# Patient Record
Sex: MALE | Race: WHITE | Employment: UNEMPLOYED | URBAN - METROPOLITAN AREA
[De-identification: names, ages, dates, MRNs, and addresses within clinical notes are randomized per-mention and may not be internally consistent; named-entity substitution may affect disease eponyms.]

---

## 2017-03-15 ENCOUNTER — OFFICE VISIT (OUTPATIENT)
Dept: FAMILY MEDICINE CLINIC | Facility: CLINIC | Age: 27
End: 2017-03-15

## 2017-03-15 VITALS
WEIGHT: 301 LBS | RESPIRATION RATE: 16 BRPM | HEART RATE: 82 BPM | BODY MASS INDEX: 41.67 KG/M2 | TEMPERATURE: 98 F | SYSTOLIC BLOOD PRESSURE: 108 MMHG | HEIGHT: 71.25 IN | DIASTOLIC BLOOD PRESSURE: 70 MMHG

## 2017-03-15 DIAGNOSIS — N50.89 LUMP IN THE TESTICLE: ICD-10-CM

## 2017-03-15 DIAGNOSIS — R31.9 HEMATURIA: Primary | ICD-10-CM

## 2017-03-15 LAB
APPEARANCE: CLEAR
BILIRUBIN: NEGATIVE
GLUCOSE (URINE DIPSTICK): NEGATIVE MG/DL
KETONES (URINE DIPSTICK): NEGATIVE MG/DL
LEUKOCYTES: NEGATIVE
MULTISTIX LOT#: NORMAL NUMERIC
NITRITE, URINE: NEGATIVE
OCCULT BLOOD: NEGATIVE
PH, URINE: 6 (ref 4.5–8)
PROTEIN (URINE DIPSTICK): NEGATIVE MG/DL
SPECIFIC GRAVITY: 1.01 (ref 1–1.03)
URINE-COLOR: YELLOW
UROBILINOGEN,SEMI-QN: 0.2 MG/DL (ref 0–1.9)

## 2017-03-15 PROCEDURE — 81003 URINALYSIS AUTO W/O SCOPE: CPT | Performed by: FAMILY MEDICINE

## 2017-03-15 PROCEDURE — 87086 URINE CULTURE/COLONY COUNT: CPT | Performed by: FAMILY MEDICINE

## 2017-03-15 PROCEDURE — 99214 OFFICE O/P EST MOD 30 MIN: CPT | Performed by: FAMILY MEDICINE

## 2017-03-15 NOTE — PATIENT INSTRUCTIONS
-- we will call you with urine culture results  -- come in for fasting bloodwork in the next couple of days  -- drink plenty of water during the day  -- call to schedule testicular ultrasound  -- followup if blood comes back

## 2017-03-15 NOTE — PROGRESS NOTES
Tyrell Candelaria is a 23year old male here for Patient presents with:  Hematuria      HPI:     Hematuria  -notes 2 episodes yesterday  -since then, has not had recurrence  -denies dysuria, increased frequency  -no abd pain, nausea, flank pain  -did notic systems reviewed are negative    PHYSICAL EXAM:   /70 mmHg  Pulse 82  Temp(Src) 98.4 °F (36.9 °C) (Oral)  Resp 16  Ht 71.25\"  Wt 301 lb  BMI 41.68 kg/m2    Gen: NAD, alert and oriented x 3  Head: NCAT, pupils equal and round  Pulm: CTAB, no wheezing

## 2017-03-16 ENCOUNTER — LAB ENCOUNTER (OUTPATIENT)
Dept: LAB | Age: 27
End: 2017-03-16
Attending: Other
Payer: COMMERCIAL

## 2017-03-16 ENCOUNTER — HOSPITAL ENCOUNTER (OUTPATIENT)
Dept: ULTRASOUND IMAGING | Age: 27
Discharge: HOME | End: 2017-03-16
Attending: FAMILY MEDICINE
Payer: COMMERCIAL

## 2017-03-16 DIAGNOSIS — N50.89 LUMP IN THE TESTICLE: ICD-10-CM

## 2017-03-16 DIAGNOSIS — F34.89 OTHER SPECIFIED PERSISTENT MOOD DISORDERS (HCC): ICD-10-CM

## 2017-03-16 LAB
25-HYDROXYVITAMIN D (TOTAL): 29.8 NG/ML (ref 30–100)
ALBUMIN SERPL-MCNC: 3.8 G/DL (ref 3.5–4.8)
ALP LIVER SERPL-CCNC: 48 U/L (ref 45–117)
ALT SERPL-CCNC: 31 U/L (ref 17–63)
AMPHETAMINE URINE: NEGATIVE
AST SERPL-CCNC: 11 U/L (ref 15–41)
BARBITURATES URINE: NEGATIVE
BASOPHILS # BLD AUTO: 0.04 X10(3) UL (ref 0–0.1)
BASOPHILS NFR BLD AUTO: 0.6 %
BENZODIAZEPINES URINE: NEGATIVE
BILIRUB SERPL-MCNC: 0.4 MG/DL (ref 0.1–2)
BUN BLD-MCNC: 12 MG/DL (ref 8–20)
CALCIUM BLD-MCNC: 9.4 MG/DL (ref 8.3–10.3)
CANNABINOID URINE: NEGATIVE
CHLORIDE: 109 MMOL/L (ref 101–111)
CHOLEST SMN-MCNC: 174 MG/DL (ref ?–200)
CO2: 26 MMOL/L (ref 22–32)
COCAINE URINE: NEGATIVE
CREAT BLD-MCNC: 0.86 MG/DL (ref 0.7–1.3)
EOSINOPHIL # BLD AUTO: 0.08 X10(3) UL (ref 0–0.3)
EOSINOPHIL NFR BLD AUTO: 1.2 %
ERYTHROCYTE [DISTWIDTH] IN BLOOD BY AUTOMATED COUNT: 12.2 % (ref 11.5–16)
EST. AVERAGE GLUCOSE BLD GHB EST-MCNC: 94 MG/DL (ref 68–126)
GLUCOSE BLD-MCNC: 96 MG/DL (ref 70–99)
HAV AB SERPL IA-ACNC: 731 PG/ML (ref 193–986)
HBA1C MFR BLD HPLC: 4.9 % (ref ?–5.7)
HCT VFR BLD AUTO: 47.2 % (ref 37–53)
HDLC SERPL-MCNC: 37 MG/DL (ref 45–?)
HDLC SERPL: 4.7 {RATIO} (ref ?–4.97)
HGB BLD-MCNC: 16 G/DL (ref 13–17)
IMMATURE GRANULOCYTE COUNT: 0.04 X10(3) UL (ref 0–1)
IMMATURE GRANULOCYTE RATIO %: 0.6 %
LDLC SERPL CALC-MCNC: 106 MG/DL (ref ?–130)
LYMPHOCYTES # BLD AUTO: 2.01 X10(3) UL (ref 0.9–4)
LYMPHOCYTES NFR BLD AUTO: 30.5 %
M PROTEIN MFR SERPL ELPH: 6.6 G/DL (ref 6.1–8.3)
MCH RBC QN AUTO: 29.6 PG (ref 27–33.2)
MCHC RBC AUTO-ENTMCNC: 33.9 G/DL (ref 31–37)
MCV RBC AUTO: 87.4 FL (ref 80–99)
MONOCYTES # BLD AUTO: 0.46 X10(3) UL (ref 0.1–0.6)
MONOCYTES NFR BLD AUTO: 7 %
NEUTROPHIL ABS PRELIM: 3.95 X10 (3) UL (ref 1.3–6.7)
NEUTROPHILS # BLD AUTO: 3.95 X10(3) UL (ref 1.3–6.7)
NEUTROPHILS NFR BLD AUTO: 60.1 %
NONHDLC SERPL-MCNC: 137 MG/DL (ref ?–130)
OPIATE URINE: NEGATIVE
PCP URINE: NEGATIVE
PLATELET # BLD AUTO: 178 10(3)UL (ref 150–450)
POTASSIUM SERPL-SCNC: 4 MMOL/L (ref 3.6–5.1)
RBC # BLD AUTO: 5.4 X10(6)UL (ref 4.3–5.7)
RED CELL DISTRIBUTION WIDTH-SD: 38.6 FL (ref 35.1–46.3)
SODIUM SERPL-SCNC: 143 MMOL/L (ref 136–144)
TRIGLYCERIDES: 156 MG/DL (ref ?–150)
TSI SER-ACNC: 1.45 MIU/ML (ref 0.35–5.5)
VLDL: 31 MG/DL (ref 5–40)
WBC # BLD AUTO: 6.6 X10(3) UL (ref 4–13)

## 2017-03-16 PROCEDURE — 76870 US EXAM SCROTUM: CPT

## 2017-03-16 PROCEDURE — 80061 LIPID PANEL: CPT

## 2017-03-16 PROCEDURE — 83036 HEMOGLOBIN GLYCOSYLATED A1C: CPT

## 2017-03-16 PROCEDURE — 82607 VITAMIN B-12: CPT

## 2017-03-16 PROCEDURE — 93975 VASCULAR STUDY: CPT

## 2017-03-16 PROCEDURE — 85025 COMPLETE CBC W/AUTO DIFF WBC: CPT

## 2017-03-16 PROCEDURE — 84443 ASSAY THYROID STIM HORMONE: CPT

## 2017-03-16 PROCEDURE — 82306 VITAMIN D 25 HYDROXY: CPT

## 2017-03-16 PROCEDURE — 80053 COMPREHEN METABOLIC PANEL: CPT

## 2017-03-16 PROCEDURE — 80307 DRUG TEST PRSMV CHEM ANLYZR: CPT

## 2017-03-16 PROCEDURE — 36415 COLL VENOUS BLD VENIPUNCTURE: CPT

## 2017-04-07 ENCOUNTER — TELEPHONE (OUTPATIENT)
Dept: FAMILY MEDICINE CLINIC | Facility: CLINIC | Age: 27
End: 2017-04-07

## 2017-04-07 DIAGNOSIS — E55.9 VITAMIN D DEFICIENCY: Primary | ICD-10-CM

## 2017-04-07 RX ORDER — ERGOCALCIFEROL 1.25 MG/1
50000 CAPSULE ORAL WEEKLY
Qty: 12 CAPSULE | Refills: 0 | Status: SHIPPED | OUTPATIENT
Start: 2017-04-07 | End: 2017-07-06

## 2017-04-07 NOTE — TELEPHONE ENCOUNTER
The patient was informed that Alisha Escobar reviewed his recent lab results and that she would like him to schedule an appointment with her to discuss his results.   The patient stated that his parents were out of town so he wasn't sure when he would be able to

## 2017-04-11 ENCOUNTER — TELEPHONE (OUTPATIENT)
Dept: FAMILY MEDICINE CLINIC | Facility: CLINIC | Age: 27
End: 2017-04-11

## 2017-04-11 NOTE — TELEPHONE ENCOUNTER
Yes we did receive it and gave instructions. He was suppose to start vitamin D and make follow up appointment to discuss weight loss and lab results.

## 2017-04-11 NOTE — TELEPHONE ENCOUNTER
Ren Izquierdo from [facility]  office called to find out if [facility] received Plan of care letter from Dr. Damaris Thakur.   (Abnormal Labs)  Please call Ren Izquierdo at [phone]

## 2017-07-07 ENCOUNTER — OFFICE VISIT (OUTPATIENT)
Dept: FAMILY MEDICINE CLINIC | Facility: CLINIC | Age: 27
End: 2017-07-07

## 2017-07-07 VITALS
WEIGHT: 279 LBS | SYSTOLIC BLOOD PRESSURE: 128 MMHG | DIASTOLIC BLOOD PRESSURE: 90 MMHG | BODY MASS INDEX: 39.06 KG/M2 | OXYGEN SATURATION: 97 % | TEMPERATURE: 98 F | HEIGHT: 71 IN | RESPIRATION RATE: 17 BRPM | HEART RATE: 82 BPM

## 2017-07-07 DIAGNOSIS — E53.8 B12 DEFICIENCY: ICD-10-CM

## 2017-07-07 DIAGNOSIS — F98.8 ATTENTION DEFICIT DISORDER, UNSPECIFIED HYPERACTIVITY PRESENCE: ICD-10-CM

## 2017-07-07 DIAGNOSIS — E78.1 HIGH TRIGLYCERIDES: ICD-10-CM

## 2017-07-07 DIAGNOSIS — E55.9 VITAMIN D DEFICIENCY: ICD-10-CM

## 2017-07-07 DIAGNOSIS — Z91.09 SENSITIVITY TO SUNLIGHT: ICD-10-CM

## 2017-07-07 DIAGNOSIS — E78.6 LOW HDL (UNDER 40): ICD-10-CM

## 2017-07-07 DIAGNOSIS — Z00.00 PHYSICAL EXAM: Primary | ICD-10-CM

## 2017-07-07 PROCEDURE — 99395 PREV VISIT EST AGE 18-39: CPT | Performed by: PHYSICIAN ASSISTANT

## 2017-07-07 NOTE — PROGRESS NOTES
CHIEF COMPLAINT:     Patient presents with:  Physical: Work physical       HPI:   Robb Blake is a 33year old male who presents for a work physical. He works at [employer]. 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  He gets very sick with prolonged 128/90   Pulse 82   Temp 98.3 °F (36.8 °C) (Oral)   Resp 17   Ht 71\"   Wt 279 lb   SpO2 97%   BMI 38.91 kg/m²   GENERAL: well developed, well nourished,in no apparent distress  SKIN: no rashes,no suspicious lesions  HEAD: atraumatic, normocephalic  EYES:

## 2017-07-19 ENCOUNTER — LAB ENCOUNTER (OUTPATIENT)
Dept: LAB | Age: 27
End: 2017-07-19
Attending: Other
Payer: MEDICAID

## 2017-07-19 DIAGNOSIS — F90.0 ATTENTION DEFICIT HYPERACTIVITY DISORDER, INATTENTIVE TYPE: ICD-10-CM

## 2017-07-19 DIAGNOSIS — E55.9 VITAMIN D DEFICIENCY: ICD-10-CM

## 2017-07-19 LAB
25-HYDROXYVITAMIN D (TOTAL): 34.8 NG/ML (ref 30–100)
BARBITURATES URINE: NEGATIVE
BENZODIAZEPINES URINE: NEGATIVE
CANNABINOID URINE: NEGATIVE
COCAINE URINE: NEGATIVE
OPIATE URINE: NEGATIVE
PCP URINE: NEGATIVE

## 2017-07-19 PROCEDURE — 80307 DRUG TEST PRSMV CHEM ANLYZR: CPT

## 2017-07-19 PROCEDURE — 82306 VITAMIN D 25 HYDROXY: CPT

## 2017-07-19 PROCEDURE — 36415 COLL VENOUS BLD VENIPUNCTURE: CPT

## 2017-07-19 PROCEDURE — 80324 DRUG SCREEN AMPHETAMINES 1/2: CPT

## 2017-07-24 LAB
AMPHETAMINE, URINE: 3735 NG/ML
METAMPHETAMINE, URINE: <200 NG/ML
METHYLENEDIOXYAMPHETAMINE: <200 NG/ML
METHYLENEDIOXYETHYLAMPHETAMINE: <200 NG/ML
METHYLENEDIOXYMETAMPHETAMINE: <200 NG/ML

## 2017-09-01 ENCOUNTER — OFFICE VISIT (OUTPATIENT)
Dept: FAMILY MEDICINE CLINIC | Facility: CLINIC | Age: 27
End: 2017-09-01

## 2017-09-01 VITALS
WEIGHT: 270 LBS | DIASTOLIC BLOOD PRESSURE: 80 MMHG | BODY MASS INDEX: 38 KG/M2 | OXYGEN SATURATION: 98 % | HEART RATE: 90 BPM | RESPIRATION RATE: 16 BRPM | SYSTOLIC BLOOD PRESSURE: 120 MMHG | TEMPERATURE: 98 F

## 2017-09-01 DIAGNOSIS — H61.23 BILATERAL IMPACTED CERUMEN: ICD-10-CM

## 2017-09-01 DIAGNOSIS — H61.891 IRRITATION OF EXTERNAL EAR CANAL, RIGHT: Primary | ICD-10-CM

## 2017-09-01 PROCEDURE — 69209 REMOVE IMPACTED EAR WAX UNI: CPT | Performed by: NURSE PRACTITIONER

## 2017-09-01 NOTE — PROGRESS NOTES
CHIEF COMPLAINT:   Patient presents with:  Ear Problem: Right worse than left, feels clogged      HPI:   Noman Crowe is a 33year old male who presents for upper respiratory symptoms for  1 weeks. Patient reports muffled hearing.  Symptoms have been s HEAD: atraumatic, normocephalic.  no tenderness on palpation of maxillary or frontal sinuses.   EYES: conjunctiva clear, EOM intact  EARS: TM's clear, ear canals narrow, no bulging, no retraction,small clear fluid, bony landmarks visible  NOSE: Nostrils pat Tiny glands in your ear make substances that combine with dead skin cells to form earwax. Earwax helps protect your ear canal from water, dirt, infection, and injury.  Over time, earwax travels from the inner part of your ear canal to the entrance of the ca · Ear drops. These help to soften the earwax. This helps it leave the ear over time. · Rinsing (irrigation) of the ear canal with water. This is done in a doctor’s office. · Removal of the earwax with small tools. This is also done in a doctor’s office.

## 2017-09-12 ENCOUNTER — CHARTING TRANS (OUTPATIENT)
Dept: OCCUPATIONAL MEDICINE | Age: 27
End: 2017-09-12

## 2017-09-19 ENCOUNTER — CHARTING TRANS (OUTPATIENT)
Dept: OTHER | Age: 27
End: 2017-09-19

## 2017-09-26 ENCOUNTER — CHARTING TRANS (OUTPATIENT)
Dept: OCCUPATIONAL MEDICINE | Age: 27
End: 2017-09-26

## 2017-10-13 ENCOUNTER — CHARTING TRANS (OUTPATIENT)
Dept: OCCUPATIONAL MEDICINE | Age: 27
End: 2017-10-13

## 2017-10-28 ENCOUNTER — LAB ENCOUNTER (OUTPATIENT)
Dept: LAB | Age: 27
End: 2017-10-28
Attending: Other
Payer: MEDICAID

## 2017-10-28 ENCOUNTER — HOSPITAL ENCOUNTER (EMERGENCY)
Age: 27
Discharge: HOME | End: 2017-10-28
Attending: EMERGENCY MEDICINE
Payer: MEDICAID

## 2017-10-28 VITALS
TEMPERATURE: 98 F | BODY MASS INDEX: 36.4 KG/M2 | WEIGHT: 260 LBS | RESPIRATION RATE: 18 BRPM | HEIGHT: 71 IN | DIASTOLIC BLOOD PRESSURE: 83 MMHG | HEART RATE: 87 BPM | OXYGEN SATURATION: 98 % | SYSTOLIC BLOOD PRESSURE: 126 MMHG

## 2017-10-28 DIAGNOSIS — H60.312 ACUTE DIFFUSE OTITIS EXTERNA OF LEFT EAR: Primary | ICD-10-CM

## 2017-10-28 DIAGNOSIS — F41.9 ANXIETY DISORDER, UNSPECIFIED TYPE: ICD-10-CM

## 2017-10-28 PROCEDURE — 99283 EMERGENCY DEPT VISIT LOW MDM: CPT

## 2017-10-28 PROCEDURE — 80307 DRUG TEST PRSMV CHEM ANLYZR: CPT

## 2017-10-28 PROCEDURE — 83036 HEMOGLOBIN GLYCOSYLATED A1C: CPT

## 2017-10-28 PROCEDURE — 80053 COMPREHEN METABOLIC PANEL: CPT

## 2017-10-28 PROCEDURE — 85025 COMPLETE CBC W/AUTO DIFF WBC: CPT

## 2017-10-28 PROCEDURE — 80324 DRUG SCREEN AMPHETAMINES 1/2: CPT

## 2017-10-28 PROCEDURE — 82306 VITAMIN D 25 HYDROXY: CPT

## 2017-10-28 PROCEDURE — 36415 COLL VENOUS BLD VENIPUNCTURE: CPT

## 2017-10-28 PROCEDURE — 80061 LIPID PANEL: CPT

## 2017-10-28 PROCEDURE — 82607 VITAMIN B-12: CPT

## 2017-10-28 PROCEDURE — 84443 ASSAY THYROID STIM HORMONE: CPT

## 2017-10-28 RX ORDER — CIPROFLOXACIN AND DEXAMETHASONE 3; 1 MG/ML; MG/ML
4 SUSPENSION/ DROPS AURICULAR (OTIC) 2 TIMES DAILY
Qty: 1 BOTTLE | Refills: 0 | Status: SHIPPED | OUTPATIENT
Start: 2017-10-28 | End: 2017-11-04

## 2017-10-28 NOTE — ED PROVIDER NOTES
Patient Seen in: THE [facility] Emergency Department In [place]    History   Patient presents with:  Ear Problem Pain (neurosensory)    Stated Complaint: left ear pain since last night    67-year-old male pain for the past 1-2 days.   Patient has been putting Hematological: Negative. Psychiatric/Behavioral: Negative. All other systems reviewed and are negative. Positive for stated complaint: left ear pain since last night  Other systems are as noted in HPI. Constitutional and vital signs reviewed. well  ============================================================  ED Course  ------------------------------------------------------------  MDM    I discussed the diagnosis and need for followup with their primary care physician for further evaluation and

## 2017-10-28 NOTE — ED PROVIDER NOTES
I reviewed that chart and discussed the case with the physician assistant. I have examined the patient and noted patient has otitis externa. Will be given a WIC along with some Ciprodex drops. .    I agree with the physician assistant assessment and diagn

## 2017-11-01 ENCOUNTER — CHARTING TRANS (OUTPATIENT)
Dept: OCCUPATIONAL MEDICINE | Age: 27
End: 2017-11-01

## 2017-12-30 ENCOUNTER — OFFICE VISIT (OUTPATIENT)
Dept: FAMILY MEDICINE CLINIC | Facility: CLINIC | Age: 27
End: 2017-12-30

## 2017-12-30 VITALS
HEIGHT: 71 IN | BODY MASS INDEX: 37.1 KG/M2 | DIASTOLIC BLOOD PRESSURE: 84 MMHG | RESPIRATION RATE: 20 BRPM | WEIGHT: 265 LBS | OXYGEN SATURATION: 98 % | HEART RATE: 105 BPM | SYSTOLIC BLOOD PRESSURE: 122 MMHG | TEMPERATURE: 98 F

## 2017-12-30 DIAGNOSIS — J06.9 UPPER RESPIRATORY INFECTION WITH COUGH AND CONGESTION: Primary | ICD-10-CM

## 2017-12-30 PROCEDURE — 99213 OFFICE O/P EST LOW 20 MIN: CPT | Performed by: NURSE PRACTITIONER

## 2017-12-30 NOTE — PATIENT INSTRUCTIONS
1. Rest. Drink plenty of fluids. 2. Continue home meds. 3. Tylenol/Ibuprofen for pain/fevers  4. Use humidifier at home when possible.   5. Follow up with PMD in 3-4 days for reeval. Follow up sooner or go to the emergency department immediately if sympt · Over-the-counter cold medicines will not shorten the length of time you’re sick, but they may be helpful for the following symptoms: cough, sore throat, and nasal and sinus congestion.  (Note: Do not use decongestants if you have high blood pressure.)  Fo

## 2017-12-30 NOTE — PROGRESS NOTES
CHIEF COMPLAINT:   Patient presents with:  Cough  Sinus Problem      HPI:   Henny Traore is a 33year old male who presents for upper respiratory symptoms for  5 days. Patient reports congestion, dry cough. Symptoms have been intermittent since onset. /84   Pulse 105   Temp 98.2 °F (36.8 °C) (Oral)   Resp 20   Ht 71\"   Wt 265 lb   SpO2 98%   BMI 36.96 kg/m²   GENERAL: well developed, well nourished,in no apparent distress  SKIN: no rashes,no suspicious lesions  HEAD: atraumatic, normocephalic.   Sary Davis 5. Follow up with PMD in 3-4 days for reeval. Follow up sooner or go to the emergency department immediately if symptoms worsen, change, or if you have any concerns.       Viral Upper Respiratory Illness (Adult)  You have a viral upper respiratory illness ( · Over-the-counter cold medicines will not shorten the length of time you’re sick, but they may be helpful for the following symptoms: cough, sore throat, and nasal and sinus congestion.  (Note: Do not use decongestants if you have high blood pressure.)  Fo

## 2018-02-13 ENCOUNTER — HOSPITAL ENCOUNTER (EMERGENCY)
Age: 28
Discharge: HOME | End: 2018-02-13
Attending: EMERGENCY MEDICINE
Payer: COMMERCIAL

## 2018-02-13 VITALS
WEIGHT: 262 LBS | HEART RATE: 91 BPM | SYSTOLIC BLOOD PRESSURE: 121 MMHG | BODY MASS INDEX: 36.68 KG/M2 | RESPIRATION RATE: 16 BRPM | OXYGEN SATURATION: 99 % | TEMPERATURE: 99 F | DIASTOLIC BLOOD PRESSURE: 72 MMHG | HEIGHT: 71 IN

## 2018-02-13 DIAGNOSIS — S91.302A OPEN WOUND OF LEFT HEEL, INITIAL ENCOUNTER: Primary | ICD-10-CM

## 2018-02-13 PROCEDURE — 99283 EMERGENCY DEPT VISIT LOW MDM: CPT

## 2018-02-13 NOTE — ED PROVIDER NOTES
Patient Seen in: THE [facility] Emergency Department In [place]    History   Patient presents with:  Abscess (integumentary)    Stated Complaint: abscess     HPI  Patient is a 52-year-old male who states that for the past several days he has noticed a wound t strong dorsalis pedis pulse. SKIN: No rash, good turgor. NEURO: Patient answers questions appropriately. No focal deficits appreciated.          ED Course   Labs Reviewed - No data to display    ED Course as of Feb 13 1413  ------------------------------

## 2018-03-30 ENCOUNTER — HOSPITAL ENCOUNTER (EMERGENCY)
Age: 28
Discharge: HOME | End: 2018-03-30
Attending: EMERGENCY MEDICINE
Payer: COMMERCIAL

## 2018-03-30 VITALS
TEMPERATURE: 97 F | RESPIRATION RATE: 18 BRPM | SYSTOLIC BLOOD PRESSURE: 134 MMHG | DIASTOLIC BLOOD PRESSURE: 78 MMHG | HEIGHT: 71 IN | WEIGHT: 257 LBS | BODY MASS INDEX: 35.98 KG/M2 | HEART RATE: 66 BPM | OXYGEN SATURATION: 99 %

## 2018-03-30 DIAGNOSIS — T50.901A ACCIDENTAL MEDICATION OVERDOSE, INITIAL ENCOUNTER: Primary | ICD-10-CM

## 2018-03-30 PROCEDURE — 99284 EMERGENCY DEPT VISIT MOD MDM: CPT

## 2018-03-30 PROCEDURE — 93005 ELECTROCARDIOGRAM TRACING: CPT

## 2018-03-30 PROCEDURE — 93010 ELECTROCARDIOGRAM REPORT: CPT

## 2018-03-30 RX ORDER — QUETIAPINE 400 MG/1
400 TABLET, FILM COATED ORAL NIGHTLY
COMMUNITY
End: 2018-06-02

## 2018-03-30 RX ORDER — QUETIAPINE 300 MG/1
300 TABLET, FILM COATED ORAL NIGHTLY
COMMUNITY
End: 2018-06-02

## 2018-03-30 NOTE — ED NOTES
Poison control contacted. Case number [id number], spoke to [address] [place] [address]. Supportive care and EKG recommended, watch for QRS prolongation or changes.  EKG in progress

## 2018-03-30 NOTE — ED PROVIDER NOTES
Patient Seen in: [facility] Emergency Department In [place]    History   Patient presents with:  Ingestion    Stated Complaint: states took a total of three pills of quetiapan should have only been two.  thou*    HPI    18-year-old male presents emergency Alcohol use: No                Review of Systems    Positive for stated complaint: states took a total of three pills of quetiapan should have only been two. thou*  Other systems are as noted in HPI. Constitutional and vital signs reviewed.       A 66  Rhythm: Sinus Rhythm  Reading: Normal sinus rhythm, no acute ST or T-wave abnormality           ED Course as of Mar 30 1019  ------------------------------------------------------------       MDM       Patient placed on cardiac monitor, EKG performed.

## 2018-03-30 NOTE — ED INITIAL ASSESSMENT (HPI)
Pt took 2 400mg quetiapine and 1 300mg tablet Wednesday evening at 8pm. Spoke to pt md yesterday and was told to go to ED to be medically cleared last night. Pt sts he is a \"tad dizzy\" but otherwise feels ok.

## 2018-04-14 ENCOUNTER — HOSPITAL ENCOUNTER (EMERGENCY)
Age: 28
Discharge: HOME | End: 2018-04-14
Attending: EMERGENCY MEDICINE
Payer: COMMERCIAL

## 2018-04-14 VITALS
SYSTOLIC BLOOD PRESSURE: 120 MMHG | BODY MASS INDEX: 35.98 KG/M2 | OXYGEN SATURATION: 98 % | WEIGHT: 257 LBS | RESPIRATION RATE: 16 BRPM | HEIGHT: 71 IN | HEART RATE: 55 BPM | DIASTOLIC BLOOD PRESSURE: 76 MMHG | TEMPERATURE: 97 F

## 2018-04-14 DIAGNOSIS — R19.7 NAUSEA VOMITING AND DIARRHEA: Primary | ICD-10-CM

## 2018-04-14 DIAGNOSIS — R11.2 NAUSEA VOMITING AND DIARRHEA: Primary | ICD-10-CM

## 2018-04-14 PROCEDURE — 81001 URINALYSIS AUTO W/SCOPE: CPT | Performed by: EMERGENCY MEDICINE

## 2018-04-14 PROCEDURE — 96360 HYDRATION IV INFUSION INIT: CPT

## 2018-04-14 PROCEDURE — 99284 EMERGENCY DEPT VISIT MOD MDM: CPT

## 2018-04-14 PROCEDURE — 83690 ASSAY OF LIPASE: CPT | Performed by: EMERGENCY MEDICINE

## 2018-04-14 PROCEDURE — 85025 COMPLETE CBC W/AUTO DIFF WBC: CPT | Performed by: EMERGENCY MEDICINE

## 2018-04-14 PROCEDURE — 80053 COMPREHEN METABOLIC PANEL: CPT | Performed by: EMERGENCY MEDICINE

## 2018-04-14 RX ORDER — ONDANSETRON 2 MG/ML
4 INJECTION INTRAMUSCULAR; INTRAVENOUS
Status: DISCONTINUED | OUTPATIENT
Start: 2018-04-14 | End: 2018-04-14

## 2018-04-14 RX ORDER — ONDANSETRON 8 MG/1
8 TABLET, ORALLY DISINTEGRATING ORAL EVERY 6 HOURS PRN
Qty: 10 TABLET | Refills: 0
Start: 2018-04-14 | End: 2018-04-21

## 2018-04-14 RX ORDER — ONDANSETRON 8 MG/1
8 TABLET, ORALLY DISINTEGRATING ORAL EVERY 6 HOURS PRN
Qty: 10 TABLET | Refills: 0 | Status: SHIPPED | OUTPATIENT
Start: 2018-04-14 | End: 2018-04-21

## 2018-04-14 NOTE — ED PROVIDER NOTES
Patient Seen in: THE [facility] Emergency Department In [place]    History   Patient presents with:  Vomiting    Stated Complaint: vomiting x3 days     HPI    Patient complains of nausea, vomiting, and diarrhea ongoing now for 3 days.   Patient has had 1 or 2 107/66   Pulse 54   Temp (!) 97.4 °F (36.3 °C) (Oral)   Resp 18   Ht 180.3 cm (5' 11\")   Wt 116.6 kg   SpO2 99%   BMI 35.84 kg/m²         Physical Exam  General: The patient is awake, alert, conversant.   Patient answers questions quickly and appropriately DIFFERENTIAL       ED Course as of Apr 14 1346  ------------------------------------------------------------       MDM     Patient's nausea, vomiting, and diarrhea suggests infectious gastroneuritis. His abdominal examination is completely benign.   He mistry

## 2018-06-02 ENCOUNTER — HOSPITAL ENCOUNTER (EMERGENCY)
Age: 28
Discharge: HOME | End: 2018-06-02
Attending: EMERGENCY MEDICINE
Payer: COMMERCIAL

## 2018-06-02 VITALS
HEART RATE: 58 BPM | HEIGHT: 71 IN | RESPIRATION RATE: 20 BRPM | BODY MASS INDEX: 37.38 KG/M2 | WEIGHT: 267 LBS | SYSTOLIC BLOOD PRESSURE: 121 MMHG | TEMPERATURE: 97 F | OXYGEN SATURATION: 98 % | DIASTOLIC BLOOD PRESSURE: 68 MMHG

## 2018-06-02 DIAGNOSIS — M62.838 MUSCLE SPASMS OF NECK: Primary | ICD-10-CM

## 2018-06-02 PROCEDURE — 99283 EMERGENCY DEPT VISIT LOW MDM: CPT

## 2018-06-02 RX ORDER — CYCLOBENZAPRINE HCL 10 MG
5 TABLET ORAL 3 TIMES DAILY PRN
Qty: 10 TABLET | Refills: 0 | Status: SHIPPED | OUTPATIENT
Start: 2018-06-02 | End: 2018-06-09

## 2018-06-02 NOTE — ED PROVIDER NOTES
Patient Seen in: [facility] Emergency Department In [place]    History   Patient presents with:  Neck Pain (musculoskeletal, neurologic)    Stated Complaint: NECK AND SHOULDER PAIN SINCE YESTERDAY,  WOKE UP WITH THE PAIN    HPI    50-year-old male present BMI 37.24 kg/m²         Physical Exam    GENERAL: Patient is awake, alert, well-appearing, in no acute distress. HEENT:  no scleral icterus. Mucous membranes are moist  Scalp is atraumatic. NECK: No midline cervical spine tenderness or step-off.   There

## 2018-06-02 NOTE — ED INITIAL ASSESSMENT (HPI)
WOKE UP WITH NECK AND SHOULDER PAIN YESTERDAY. DENIES TRAUMA.   STATES TOOK 400MG IBUPROFEN THIS AM AT 0700

## 2018-06-25 ENCOUNTER — HOSPITAL ENCOUNTER (EMERGENCY)
Age: 28
Discharge: HOME | End: 2018-06-25
Payer: COMMERCIAL

## 2018-06-25 ENCOUNTER — APPOINTMENT (OUTPATIENT)
Dept: GENERAL RADIOLOGY | Age: 28
End: 2018-06-25
Payer: COMMERCIAL

## 2018-06-25 VITALS
HEIGHT: 71 IN | TEMPERATURE: 98 F | SYSTOLIC BLOOD PRESSURE: 102 MMHG | HEART RATE: 60 BPM | WEIGHT: 267 LBS | OXYGEN SATURATION: 100 % | DIASTOLIC BLOOD PRESSURE: 64 MMHG | RESPIRATION RATE: 18 BRPM | BODY MASS INDEX: 37.38 KG/M2

## 2018-06-25 DIAGNOSIS — M77.8 LEFT WRIST TENDONITIS: Primary | ICD-10-CM

## 2018-06-25 PROCEDURE — 99283 EMERGENCY DEPT VISIT LOW MDM: CPT

## 2018-06-25 PROCEDURE — 73110 X-RAY EXAM OF WRIST: CPT

## 2018-06-25 PROCEDURE — 73090 X-RAY EXAM OF FOREARM: CPT

## 2018-06-25 RX ORDER — IBUPROFEN 600 MG/1
600 TABLET ORAL EVERY 8 HOURS PRN
Qty: 30 TABLET | Refills: 0 | Status: SHIPPED | OUTPATIENT
Start: 2018-06-25 | End: 2018-07-02

## 2018-06-25 NOTE — ED INITIAL ASSESSMENT (HPI)
States painful left wrist onset this morning states no injury just awoke with pain.  No parasthesias, cms intact pulses palpated complains of pain circumferentially around the wrist and into the forarm no obvious swelling or deformity pain is worse with fle

## 2018-06-25 NOTE — ED PROVIDER NOTES
Patient Seen in: [facility] Emergency Department In [place]    History   Patient presents with:  Pain (neurologic)    Stated Complaint: left wrist pain since this morning     HPI    52-year-old male who works at [employer] and occasionally lift data to display    ED Course as of Jun 25 1322  ------------------------------------------------------------      MDM         Left wrist splint with thumb spica applied. Patient given work note for limited use of his left upper extremity for 1 week.   Of

## 2018-10-12 ENCOUNTER — HOSPITAL ENCOUNTER (EMERGENCY)
Age: 28
Discharge: HOME | End: 2018-10-12
Attending: EMERGENCY MEDICINE
Payer: COMMERCIAL

## 2018-10-12 VITALS
DIASTOLIC BLOOD PRESSURE: 78 MMHG | TEMPERATURE: 97 F | HEART RATE: 68 BPM | SYSTOLIC BLOOD PRESSURE: 127 MMHG | HEIGHT: 71 IN | RESPIRATION RATE: 18 BRPM | BODY MASS INDEX: 41.16 KG/M2 | OXYGEN SATURATION: 98 % | WEIGHT: 294 LBS

## 2018-10-12 DIAGNOSIS — H60.501 ACUTE OTITIS EXTERNA OF RIGHT EAR, UNSPECIFIED TYPE: ICD-10-CM

## 2018-10-12 DIAGNOSIS — L30.9 DERMATITIS: Primary | ICD-10-CM

## 2018-10-12 PROCEDURE — 99283 EMERGENCY DEPT VISIT LOW MDM: CPT

## 2018-10-12 RX ORDER — OFLOXACIN 3 MG/ML
SOLUTION AURICULAR (OTIC) DAILY
Qty: 1 BOTTLE | Refills: 0 | Status: SHIPPED | OUTPATIENT
Start: 2018-10-12 | End: 2018-10-19

## 2018-10-12 RX ORDER — DIAPER,BRIEF,INFANT-TODD,DISP
EACH MISCELLANEOUS
Qty: 1.5 G | Refills: 0 | Status: SHIPPED | OUTPATIENT
Start: 2018-10-12 | End: 2018-10-25

## 2018-10-12 NOTE — ED PROVIDER NOTES
I reviewed that chart and discussed the case with the physician assistant. I have examined the patient and noted she does has a small rash over the left leg. The suspected dermatitis. Also does appear to have an ear infection.   Will be given antibiotics

## 2018-10-12 NOTE — ED PROVIDER NOTES
Patient Seen in: [facility] Emergency Department In [place]    History   Patient presents with:  Ear Problem Pain (neurosensory)    Stated Complaint: RIGHT EAR PAIN, RASH TO LEFT LEG    60-year-old  male with a history of Asperger's, bipolar affe Right Ear: Hearing and tympanic membrane normal. No lacerations. There is tenderness. No drainage or swelling. No foreign bodies. No mastoid tenderness.  Tympanic membrane is not injected, not scarred, not perforated, not erythematous, not retracted and not

## 2018-10-13 ENCOUNTER — HOSPITAL ENCOUNTER (EMERGENCY)
Age: 28
Discharge: HOME | End: 2018-10-13
Attending: EMERGENCY MEDICINE
Payer: COMMERCIAL

## 2018-10-13 VITALS
HEART RATE: 88 BPM | WEIGHT: 294.13 LBS | DIASTOLIC BLOOD PRESSURE: 77 MMHG | OXYGEN SATURATION: 98 % | TEMPERATURE: 99 F | SYSTOLIC BLOOD PRESSURE: 119 MMHG | BODY MASS INDEX: 41 KG/M2 | RESPIRATION RATE: 16 BRPM

## 2018-10-13 DIAGNOSIS — H60.501 ACUTE OTITIS EXTERNA OF RIGHT EAR, UNSPECIFIED TYPE: ICD-10-CM

## 2018-10-13 DIAGNOSIS — H66.90 ACUTE OTITIS MEDIA, UNSPECIFIED OTITIS MEDIA TYPE: Primary | ICD-10-CM

## 2018-10-13 PROCEDURE — 99283 EMERGENCY DEPT VISIT LOW MDM: CPT

## 2018-10-13 RX ORDER — AMOXICILLIN 875 MG/1
875 TABLET, COATED ORAL 2 TIMES DAILY
Qty: 20 TABLET | Refills: 0 | Status: SHIPPED | OUTPATIENT
Start: 2018-10-13 | End: 2018-10-23

## 2018-10-13 NOTE — ED PROVIDER NOTES
Patient Seen in: THE [facility] Emergency Department In [place]    History   Patient presents with:  Ear Pain    Stated Complaint: ear pain     HPI    Patient is a 16-year-old male who states that for the past 2-3 days he has had discomfort to his right ear. ear slight bulging. Slight erythema of the ear canal.  No cervical lymphadenopathy left tympanic membrane normal  LUNGS: Lungs clear to auscultation bilaterally. CARDIOVASCULAR: + S1-S2, regular rate and rhythm, no murmurs.   EXTREMITIES: Full range of mo

## 2018-10-15 ENCOUNTER — TELEPHONE (OUTPATIENT)
Dept: FAMILY MEDICINE CLINIC | Facility: CLINIC | Age: 28
End: 2018-10-15

## 2018-10-15 NOTE — TELEPHONE ENCOUNTER
LVM for patient to call back no name on message so I did not leave a detailed message. Patient was in the ER for right ear pain and rash on left leg.

## 2018-10-25 ENCOUNTER — HOSPITAL ENCOUNTER (EMERGENCY)
Age: 28
Discharge: HOME | End: 2018-10-25
Attending: EMERGENCY MEDICINE
Payer: COMMERCIAL

## 2018-10-25 VITALS
BODY MASS INDEX: 41.16 KG/M2 | DIASTOLIC BLOOD PRESSURE: 69 MMHG | SYSTOLIC BLOOD PRESSURE: 124 MMHG | HEART RATE: 73 BPM | RESPIRATION RATE: 16 BRPM | OXYGEN SATURATION: 97 % | HEIGHT: 71 IN | WEIGHT: 294 LBS | TEMPERATURE: 99 F

## 2018-10-25 DIAGNOSIS — H66.90 ACUTE OTITIS MEDIA, UNSPECIFIED OTITIS MEDIA TYPE: Primary | ICD-10-CM

## 2018-10-25 DIAGNOSIS — H60.502 ACUTE OTITIS EXTERNA OF LEFT EAR, UNSPECIFIED TYPE: ICD-10-CM

## 2018-10-25 PROCEDURE — 99283 EMERGENCY DEPT VISIT LOW MDM: CPT

## 2018-10-25 RX ORDER — CLARITHROMYCIN 500 MG/1
500 TABLET, COATED ORAL 2 TIMES DAILY
Qty: 20 TABLET | Refills: 0 | Status: SHIPPED | OUTPATIENT
Start: 2018-10-25 | End: 2018-11-04

## 2018-10-25 NOTE — ED NOTES
The patient's pharmacy calls stating that the Cortisporin TC is no longer made. I switch the patient's prescription to Cortisporin HC and correct the instructions for the new drug.

## 2018-10-25 NOTE — ED PROVIDER NOTES
Patient Seen in: THE [facility] Emergency Department In [place]    History   Patient presents with:  Ear Problem Pain (neurosensory)    Stated Complaint: bilateral ear aches    HPI    Patient is a 52-year-old male who presents emergency room with a history of are moist.  Right tympanic membrane is negative. Left main membrane shows moderate erythema and dullness to landmarks and with otitis media.   There is also some debris noted in the left external auditory canal.  NECK: There is no focal tenderness to palpa Bottle, R-0

## 2018-10-26 ENCOUNTER — TELEPHONE (OUTPATIENT)
Dept: FAMILY MEDICINE CLINIC | Facility: CLINIC | Age: 28
End: 2018-10-26

## 2018-10-26 NOTE — TELEPHONE ENCOUNTER
LUCIANAM for patient to call us back to schedule an ER follow up with Dr Hai Price for bilateral ear aches.

## 2018-11-26 ENCOUNTER — APPOINTMENT (OUTPATIENT)
Dept: GENERAL RADIOLOGY | Age: 28
End: 2018-11-26
Attending: PHYSICIAN ASSISTANT
Payer: COMMERCIAL

## 2018-11-26 ENCOUNTER — HOSPITAL ENCOUNTER (EMERGENCY)
Age: 28
Discharge: HOME | End: 2018-11-26
Payer: COMMERCIAL

## 2018-11-26 VITALS
WEIGHT: 294 LBS | HEART RATE: 68 BPM | HEIGHT: 71 IN | DIASTOLIC BLOOD PRESSURE: 69 MMHG | RESPIRATION RATE: 16 BRPM | TEMPERATURE: 98 F | BODY MASS INDEX: 41.16 KG/M2 | SYSTOLIC BLOOD PRESSURE: 142 MMHG | OXYGEN SATURATION: 96 %

## 2018-11-26 DIAGNOSIS — M72.2 PLANTAR FASCIITIS: Primary | ICD-10-CM

## 2018-11-26 PROCEDURE — 99283 EMERGENCY DEPT VISIT LOW MDM: CPT

## 2018-11-26 PROCEDURE — 73630 X-RAY EXAM OF FOOT: CPT | Performed by: PHYSICIAN ASSISTANT

## 2018-11-26 RX ORDER — NAPROXEN 500 MG/1
500 TABLET ORAL 2 TIMES DAILY PRN
Qty: 20 TABLET | Refills: 0 | Status: SHIPPED | OUTPATIENT
Start: 2018-11-26 | End: 2018-12-03

## 2018-11-26 NOTE — ED INITIAL ASSESSMENT (HPI)
Pt c/o right foot/heel pain x2 weeks; pain increases with weight bearing activity; no deformities noted, +CMS intact

## 2018-11-26 NOTE — ED PROVIDER NOTES
Patient Seen in: [facility] Emergency Department In [place]    History   Patient presents with:  Lower Extremity Injury (musculoskeletal)    Stated Complaint: R foot pain, no known injury    HPI    Patient is a 25-year-old male with a medical history of A aware x 3  Neck: Supple, full range of motion, no thyromegaly or lymphadenopathy.   Eye examination: EOMs are intact, normal conjunctival  ENT: Atraumatic  Lung: No distress, RR, no retraction,   Extremities: Pain to palpation through the arch of the right therapy  X-ray findings demonstrate no acute fracture. Patient will proceed with the above conservative plan. Referral to podiatry for failure to improve.   Naproxen initiated      Disposition and Plan     Clinical Impression:  Plantar fasciitis  (primary

## 2018-11-27 VITALS
DIASTOLIC BLOOD PRESSURE: 82 MMHG | SYSTOLIC BLOOD PRESSURE: 118 MMHG | RESPIRATION RATE: 20 BRPM | HEART RATE: 88 BPM | TEMPERATURE: 96.7 F

## 2018-11-28 VITALS
HEIGHT: 71 IN | TEMPERATURE: 96.6 F | SYSTOLIC BLOOD PRESSURE: 100 MMHG | DIASTOLIC BLOOD PRESSURE: 70 MMHG | WEIGHT: 270 LBS | BODY MASS INDEX: 37.8 KG/M2 | RESPIRATION RATE: 20 BRPM | HEART RATE: 76 BPM

## 2018-11-28 VITALS — SYSTOLIC BLOOD PRESSURE: 118 MMHG | RESPIRATION RATE: 18 BRPM | TEMPERATURE: 96.4 F | DIASTOLIC BLOOD PRESSURE: 84 MMHG

## 2018-11-28 VITALS
SYSTOLIC BLOOD PRESSURE: 120 MMHG | DIASTOLIC BLOOD PRESSURE: 70 MMHG | HEART RATE: 100 BPM | RESPIRATION RATE: 20 BRPM | TEMPERATURE: 98 F

## 2019-03-08 ENCOUNTER — HOSPITAL ENCOUNTER (EMERGENCY)
Age: 29
Discharge: HOME | End: 2019-03-08
Attending: EMERGENCY MEDICINE
Payer: COMMERCIAL

## 2019-03-08 VITALS
RESPIRATION RATE: 16 BRPM | SYSTOLIC BLOOD PRESSURE: 125 MMHG | WEIGHT: 300 LBS | OXYGEN SATURATION: 98 % | BODY MASS INDEX: 42 KG/M2 | DIASTOLIC BLOOD PRESSURE: 75 MMHG | HEIGHT: 71 IN | HEART RATE: 76 BPM | TEMPERATURE: 98 F

## 2019-03-08 DIAGNOSIS — H66.90 ACUTE OTITIS MEDIA, UNSPECIFIED OTITIS MEDIA TYPE: Primary | ICD-10-CM

## 2019-03-08 DIAGNOSIS — M77.8 HAND TENDONITIS: ICD-10-CM

## 2019-03-08 PROCEDURE — 99283 EMERGENCY DEPT VISIT LOW MDM: CPT | Performed by: EMERGENCY MEDICINE

## 2019-03-08 RX ORDER — AMOXICILLIN 875 MG/1
875 TABLET, COATED ORAL 2 TIMES DAILY
Qty: 20 TABLET | Refills: 0 | Status: SHIPPED | OUTPATIENT
Start: 2019-03-08 | End: 2019-03-18

## 2019-03-08 NOTE — ED PROVIDER NOTES
Patient Seen in: THE [facility] Emergency Department In [place]    History   Patient presents with:  Upper Extremity Injury (musculoskeletal)  Ear Problem Pain (neurosensory)    Stated Complaint: left hand pain, bilateral ear pain    HPI    Patient is a 27-ye fluid behind the right TM. No fluid behind the left TM  Lungs: No tachypnea, clear to auscultation  Cardiac: No tachycardia  Skin: No rashes, pallor  Neuro: No focal deficits. Extremities: Left hand: Patient localizes tenderness the palm of his hand.   Wo

## 2019-03-08 NOTE — ED INITIAL ASSESSMENT (HPI)
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

## 2019-05-21 ENCOUNTER — HOSPITAL ENCOUNTER (EMERGENCY)
Age: 29
Discharge: HOME | End: 2019-05-21
Attending: EMERGENCY MEDICINE
Payer: COMMERCIAL

## 2019-05-21 VITALS
DIASTOLIC BLOOD PRESSURE: 78 MMHG | WEIGHT: 315 LBS | HEIGHT: 71 IN | BODY MASS INDEX: 44.1 KG/M2 | OXYGEN SATURATION: 98 % | HEART RATE: 75 BPM | RESPIRATION RATE: 18 BRPM | TEMPERATURE: 97 F | SYSTOLIC BLOOD PRESSURE: 115 MMHG

## 2019-05-21 DIAGNOSIS — H92.01 EARACHE ON RIGHT: ICD-10-CM

## 2019-05-21 DIAGNOSIS — S66.911A HAND STRAIN, RIGHT, INITIAL ENCOUNTER: Primary | ICD-10-CM

## 2019-05-21 PROCEDURE — 99283 EMERGENCY DEPT VISIT LOW MDM: CPT

## 2019-05-21 RX ORDER — AMOXICILLIN 500 MG/1
500 TABLET, FILM COATED ORAL 2 TIMES DAILY
Qty: 14 TABLET | Refills: 0 | Status: SHIPPED | OUTPATIENT
Start: 2019-05-21 | End: 2019-05-28

## 2019-05-21 NOTE — ED PROVIDER NOTES
Patient Seen in: THE MEDICAL [facility] Emergency Department In [place]    History   Patient presents with:  Ear Problem Pain (neurosensory)  Upper Extremity Injury (musculoskeletal)    Stated Complaint: right ear ache x few days/right hand pain    HPI    33year-old am    Follow-up:  Ishan Dallas, [address] [facility] [address]  [phone]    In 1 week  As needed        Medications Prescribed:  Current Discharge Medication List    START taking these medications    amoxicillin 500 MG Oral Tab  Take 1 table

## 2019-06-10 ENCOUNTER — HOSPITAL ENCOUNTER (EMERGENCY)
Age: 29
Discharge: HOME | End: 2019-06-10
Attending: EMERGENCY MEDICINE
Payer: COMMERCIAL

## 2019-06-10 VITALS
HEIGHT: 71 IN | WEIGHT: 315 LBS | SYSTOLIC BLOOD PRESSURE: 120 MMHG | TEMPERATURE: 97 F | RESPIRATION RATE: 16 BRPM | HEART RATE: 81 BPM | DIASTOLIC BLOOD PRESSURE: 82 MMHG | BODY MASS INDEX: 44.1 KG/M2 | OXYGEN SATURATION: 99 %

## 2019-06-10 DIAGNOSIS — H60.333 ACUTE SWIMMER'S EAR OF BOTH SIDES: Primary | ICD-10-CM

## 2019-06-10 PROCEDURE — 99283 EMERGENCY DEPT VISIT LOW MDM: CPT

## 2019-06-10 RX ORDER — NEOMYCIN SULFATE, POLYMYXIN B SULFATE AND HYDROCORTISONE 10; 3.5; 1 MG/ML; MG/ML; [USP'U]/ML
3 SUSPENSION/ DROPS AURICULAR (OTIC) ONCE
Status: COMPLETED | OUTPATIENT
Start: 2019-06-10 | End: 2019-06-10

## 2019-06-10 NOTE — ED PROVIDER NOTES
Patient Seen in: THE [facility] Emergency Department In [place]    History   Patient presents with:  Ear Problem Pain (neurosensory)    Stated Complaint: bilateral ear pain    HPI    Franki is a pleasant 32-year-old presenting with your pain.   Patient states have these and then for 1 week and he is return emerge point where she symptoms or other complaints              Disposition and Plan     Clinical Impression:  Acute swimmer's ear of both sides  (primary encounter diagnosis)    Disposition:  Discharge  6/1

## 2019-10-03 ENCOUNTER — HOSPITAL ENCOUNTER (EMERGENCY)
Age: 29
Discharge: HOME | End: 2019-10-03
Attending: EMERGENCY MEDICINE
Payer: COMMERCIAL

## 2019-10-03 VITALS
HEART RATE: 110 BPM | BODY MASS INDEX: 44.1 KG/M2 | WEIGHT: 315 LBS | DIASTOLIC BLOOD PRESSURE: 85 MMHG | TEMPERATURE: 98 F | HEIGHT: 71 IN | RESPIRATION RATE: 18 BRPM | SYSTOLIC BLOOD PRESSURE: 133 MMHG

## 2019-10-03 DIAGNOSIS — I80.9 PHLEBITIS: Primary | ICD-10-CM

## 2019-10-03 PROCEDURE — 99283 EMERGENCY DEPT VISIT LOW MDM: CPT

## 2019-10-03 RX ORDER — CEPHALEXIN 500 MG/1
500 CAPSULE ORAL 3 TIMES DAILY
Qty: 21 CAPSULE | Refills: 0 | Status: SHIPPED | OUTPATIENT
Start: 2019-10-03 | End: 2019-10-10

## 2019-10-03 NOTE — ED PROVIDER NOTES
Patient Seen in: THE [facility] Emergency Department In [place]      History   Patient presents with:  Eval-G (genital)  Cyst    Stated Complaint:     HPI    Patient is a 72-year-old male. Medical history of Asperger's, bipolar, Guillain-Barré.   Patient arri structure with inflammation and tenderness to palpation. Roughly 1.5 cm. Slightly erythematous.   Neuro: Cranial nerves intact, Normal Gait    ED Course   Labs Reviewed - No data to display        MDM   My supervising physician was involved in the [facility]

## 2019-10-03 NOTE — ED NOTES
I reviewed that chart and discussed the case. I have examined the patient an strep test complains of pain over the dorsum of the penis. No penile pain no urethral pain is really just at the dorsum at the skin of the foreskin of the penis.   The patient st

## 2022-06-02 NOTE — TELEPHONE ENCOUNTER
Dr Norwood Fresh office notified O-T Advancement Flap Text: The defect edges were debeveled with a #15 scalpel blade.  Given the location of the defect, shape of the defect and the proximity to free margins an O-T advancement flap was deemed most appropriate.  Using a sterile surgical marker, an appropriate advancement flap was drawn incorporating the defect and placing the expected incisions within the relaxed skin tension lines where possible.    The area thus outlined was incised deep to adipose tissue with a #15 scalpel blade.  The skin margins were undermined to an appropriate distance in all directions utilizing iris scissors.

## 2022-07-02 NOTE — LETTER
Date & Time: 10/13/2018, 7:30 AM  Patient: Mary  Encounter Provider(s): Cecile Castillo MD       To Whom It May Concern:    Orlando Flores was seen and treated in our department on 10/13/2018. He should not return to work until 10/16/2018. related to new diagnosis of HR-ALL

## 2023-04-27 ENCOUNTER — HOSPITAL ENCOUNTER (EMERGENCY)
Age: 33
Discharge: HOME | End: 2023-04-27
Attending: EMERGENCY MEDICINE
Payer: MEDICAID

## 2023-04-27 VITALS
HEIGHT: 71 IN | HEART RATE: 88 BPM | OXYGEN SATURATION: 99 % | WEIGHT: 315 LBS | BODY MASS INDEX: 44.1 KG/M2 | RESPIRATION RATE: 20 BRPM | DIASTOLIC BLOOD PRESSURE: 76 MMHG | SYSTOLIC BLOOD PRESSURE: 118 MMHG | TEMPERATURE: 99 F

## 2023-04-27 DIAGNOSIS — B34.9 VIRAL SYNDROME: Primary | ICD-10-CM

## 2023-04-27 LAB
POCT INFLUENZA A: NEGATIVE
POCT INFLUENZA B: NEGATIVE
SARS-COV-2 RNA RESP QL NAA+PROBE: NOT DETECTED

## 2023-04-27 PROCEDURE — 87502 INFLUENZA DNA AMP PROBE: CPT | Performed by: EMERGENCY MEDICINE

## 2023-04-27 PROCEDURE — 99283 EMERGENCY DEPT VISIT LOW MDM: CPT

## 2023-04-27 NOTE — ED INITIAL ASSESSMENT (HPI)
Pt to ed with c/o chills, diarrhea, difficulty sleeping, headaches, and nausea x 2 days   No meds PTA

## 2024-12-02 ENCOUNTER — HOSPITAL ENCOUNTER (EMERGENCY)
Age: 34
Discharge: HOME | End: 2024-12-02
Payer: MEDICAID

## 2024-12-02 ENCOUNTER — APPOINTMENT (OUTPATIENT)
Dept: GENERAL RADIOLOGY | Age: 34
End: 2024-12-02
Payer: MEDICAID

## 2024-12-02 VITALS
TEMPERATURE: 100 F | HEART RATE: 120 BPM | OXYGEN SATURATION: 96 % | WEIGHT: 315 LBS | RESPIRATION RATE: 26 BRPM | SYSTOLIC BLOOD PRESSURE: 140 MMHG | DIASTOLIC BLOOD PRESSURE: 109 MMHG | HEIGHT: 71 IN | BODY MASS INDEX: 44.1 KG/M2

## 2024-12-02 DIAGNOSIS — U07.1 COVID-19: Primary | ICD-10-CM

## 2024-12-02 LAB
POCT INFLUENZA A: NEGATIVE
POCT INFLUENZA B: NEGATIVE
SARS-COV-2 RNA RESP QL NAA+PROBE: DETECTED

## 2024-12-02 PROCEDURE — 87502 INFLUENZA DNA AMP PROBE: CPT | Performed by: PHYSICIAN ASSISTANT

## 2024-12-02 PROCEDURE — 99284 EMERGENCY DEPT VISIT MOD MDM: CPT

## 2024-12-02 PROCEDURE — 71046 X-RAY EXAM CHEST 2 VIEWS: CPT

## 2024-12-02 RX ORDER — BENZONATATE 100 MG/1
100 CAPSULE ORAL 3 TIMES DAILY PRN
Qty: 30 CAPSULE | Refills: 0 | Status: SHIPPED | OUTPATIENT
Start: 2024-12-02 | End: 2025-01-01

## 2024-12-02 RX ORDER — ALBUTEROL SULFATE 90 UG/1
4 INHALANT RESPIRATORY (INHALATION) ONCE
Status: COMPLETED | OUTPATIENT
Start: 2024-12-02 | End: 2024-12-02

## 2024-12-02 RX ORDER — ALBUTEROL SULFATE 90 UG/1
2 INHALANT RESPIRATORY (INHALATION) EVERY 4 HOURS PRN
Qty: 1 EACH | Refills: 0 | Status: SHIPPED | OUTPATIENT
Start: 2024-12-02 | End: 2025-01-01

## 2024-12-02 RX ORDER — ACETAMINOPHEN 500 MG
1000 TABLET ORAL ONCE
Status: COMPLETED | OUTPATIENT
Start: 2024-12-02 | End: 2024-12-02

## 2024-12-02 NOTE — DISCHARGE INSTRUCTIONS
Take Tessalon for cough suppression.  2 puffs of inhaler every 3-4 hours for coughing and shortness of breath.  Quarantine.  Push clear fluids.  Alternate Tylenol Motrin every 4 hours.

## 2024-12-02 NOTE — ED PROVIDER NOTES
Patient Seen in: [facility] Emergency Department In [place]      History     Chief Complaint   Patient presents with    Cough/URI     Stated Complaint: prodcutive cough, dalia ear pain d/t coughing since Sunday    Subjective:   HPI      34-year-old male.  Medical history of Asperger's, bipolar, morbid obesity.  In the last 3 to 4 days, patient is developed new URI type symptoms.  He describes vivid bronchospasm and episodic shortness of breath.  He does not believe he has had a fever.  No vomiting or diarrhea. He does endorse some headache, malaise and myalgia.    Objective:     Past Medical History:    Acute infective polyneuritis (HCC)    Gullian-Calion Syndrome    Asperger's disorder (HCC)    Bipolar affective (HCC)    Enteritis due to Rotavirus              Past Surgical History:   Procedure Laterality Date    Other surgical history  1/10/2012    left ankle foot ligament repair                Social History     Socioeconomic History    Marital status: Single   Tobacco Use    Smoking status: Never    Smokeless tobacco: Never   Substance and Sexual Activity    Alcohol use: No     Alcohol/week: 0.0 standard drinks of alcohol    Drug use: No   Other Topics Concern    Caffeine Concern Yes     Comment: 1-2 cans of pop weekly    Exercise Yes     Comment: active with volunteering     Social Drivers of Health     Financial Resource Strain: Not on File (1/6/2022)    Received from [facility]    Financial Resource Strain     Financial Resource Strain: 0   Food Insecurity: Not on File (9/26/2024)    Received from [facility]    Food Insecurity     Food: 0   Transportation Needs: Not on File (1/6/2022)    Received from [facility]    Transportation Needs     Transportation: 0   Physical Activity: Not on File (1/6/2022)    Received from [facility]    Physical Activity     Physical Activity: 0   Stress: Not on File (1/6/2022)    Received from [facility]    Stress     Stress: 0   Social Connections: Not on File (9/12/2024)     Received from [facility]    Social Connections     Connectedness: 0   Housing Stability: Not on File (2022)    Received from [facility]    Housing Stability     Housin                  Physical Exam     ED Triage Vitals [24 1450]   /39   Pulse (!) 132   Resp 18   Temp 98.9 °F (37.2 °C)   Temp src Temporal   SpO2 95 %   O2 Device None (Room air)       Current Vitals:   Vital Signs  BP: (!) 140/109  Pulse: 120  Resp: 26  Temp: 100.4 °F (38 °C)  Temp src: Oral    Oxygen Therapy  SpO2: 96 %  O2 Device: None (Room air)        Physical Exam     Gen: Well appearing, well groomed, alert and aware x 3  Neck: Supple, full range of motion, no thyromegaly or lymphadenopathy.  Eye examination: EOMs are intact, normal conjunctival  ENT: No injection noted to the bilateral auditory canals; no loss of landmarks. Normal nasal mucosa without audible nasal congestion.  Oropharynx is patent without evidence of erythema, exudates or deviation.  No stridor to auscultation  Lung: No distress, RR, no retraction, breath sounds are diminished.  Moderate bronchospasm.  Cardio: Tachycardic, normal S1-S2, no murmur appreciable  Skin: No sign of trauma, Skin warm and dry, no induration or sign of infection.  No rash noted    ED Course     Labs Reviewed   RAPID SARS-COV-2 BY PCR - Abnormal; Notable for the following components:       Result Value    Rapid SARS-CoV-2 by PCR Detected (*)     All other components within normal limits   POCT FLU TEST - Normal    Narrative:     This assay is a rapid molecular in vitro test utilizing nucleic acid amplification of influenza A and B viral RNA.        XR CHEST PA + LAT CHEST (CPT=71046)    Result Date: 2024  PROCEDURE:  XR CHEST PA + LAT CHEST (CPT=71046)  INDICATIONS:  prodcutive cough, dalia ear pain d/t coughing since   COMPARISON:  [facility], XR, CHEST PA   LATERAL, 2013, 10:54 AM.  TECHNIQUE:  PA and lateral chest radiographs were obtained.  PATIENT STATED HISTORY: (As  transcribed by Technologist)  Patient has a productive cough and runny nose, that is causing him to have trouble breathing.  He has had the cough since 11-29-24.    FINDINGS:  Heart size is within normal limits.  Pleural spaces appear clear.  Mediastinal and hilar contours are normal.  No focal consolidation.  If clinical symptoms persist then recommend follow-up imaging.            CONCLUSION:  See above.   LOCATION:  [place] [id number]   Dictated by (CST): Jose Powers MD on 12/02/2024 at 3:54 PM     Finalized by (CST): Jose Powers MD on 12/02/2024 at 3:58 PM                 MDM        When having a vivid bronchospasm, patient's heart rate increased to 133.  While at rest, quickly modulated to 113.  Breath sounds are diminished but not rhonchorous or wheezy.  He is afebrile.  94% room air.  Nursing staff performed spacer training and 4 puffs albuterol administered.  Sent for chest x-ray.  Influenza and COVID swab obtained.    IV fluids offered but declined by patient.    FINDINGS:  Heart size is within normal limits.  Pleural spaces appear clear.  Mediastinal and hilar contours are normal.  No focal consolidation.  If clinical symptoms persist then recommend follow-up imaging.            Patient returned COVID-positive.  Influenza is negative.  Heart rate improved to 112.    Patient provided with albuterol and Tessalon.  Told to push clear fluids.  Humidifier in bedroom.  Tea with honey.  Quarantine.      Take Tessalon for cough suppression.  2 puffs of inhaler every 3-4 hours for coughing and shortness of breath.  Quarantine.  Push clear fluids.  Alternate Tylenol Motrin every 4 hours.  Medical Decision Making      Disposition and Plan     Clinical Impression:  1. COVID-19         Disposition:  There is no disposition on file for this visit.  There is no disposition time on file for this visit.    Follow-up:  No follow-up provider specified.        Medications Prescribed:  Current Discharge Medication List        START taking  these medications    Details   albuterol 108 (90 Base) MCG/ACT Inhalation Aero Soln Inhale 2 puffs into the lungs every 4 (four) hours as needed for Wheezing.  Qty: 1 each, Refills: 0      benzonatate 100 MG Oral Cap Take 1 capsule (100 mg total) by mouth 3 (three) times daily as needed for cough.  Qty: 30 capsule, Refills: 0                 Supplementary Documentation:

## 2025-04-28 ENCOUNTER — HOSPITAL ENCOUNTER (EMERGENCY)
Age: 35
Discharge: HOME | End: 2025-04-28
Attending: EMERGENCY MEDICINE
Payer: MEDICAID

## 2025-04-28 VITALS
DIASTOLIC BLOOD PRESSURE: 87 MMHG | TEMPERATURE: 99 F | RESPIRATION RATE: 16 BRPM | BODY MASS INDEX: 44.1 KG/M2 | OXYGEN SATURATION: 97 % | SYSTOLIC BLOOD PRESSURE: 141 MMHG | WEIGHT: 315 LBS | HEIGHT: 71 IN | HEART RATE: 90 BPM

## 2025-04-28 DIAGNOSIS — L03.311 CELLULITIS OF ABDOMINAL WALL: Primary | ICD-10-CM

## 2025-04-28 LAB — GLUCOSE BLD-MCNC: 135 MG/DL (ref 70–99)

## 2025-04-28 PROCEDURE — 82962 GLUCOSE BLOOD TEST: CPT

## 2025-04-28 PROCEDURE — 99284 EMERGENCY DEPT VISIT MOD MDM: CPT

## 2025-04-28 PROCEDURE — 99283 EMERGENCY DEPT VISIT LOW MDM: CPT

## 2025-04-28 RX ORDER — SULFAMETHOXAZOLE AND TRIMETHOPRIM 800; 160 MG/1; MG/1
1 TABLET ORAL 2 TIMES DAILY
Qty: 14 TABLET | Refills: 0 | Status: SHIPPED | OUTPATIENT
Start: 2025-04-28 | End: 2025-05-05

## 2025-04-28 NOTE — ED INITIAL ASSESSMENT (HPI)
Pt to ED with left lower abdominal fold abscess x2-3 days. Area with erythema, no drainage. Denies fevers.

## 2025-04-28 NOTE — DISCHARGE INSTRUCTIONS
Take the Bactrim DS twice daily.    Follow up with your primary doctor.  You should have the wound rechecked in the next 24 to 72 hours.    If you have new, changing or worsening symptoms, please go directly to the ER.

## 2025-04-28 NOTE — ED PROVIDER NOTES
Patient Seen in: [facility] In [place]      History     Chief Complaint   Patient presents with    Abscess     Stated Complaint: Abcess on abd left side    Subjective:   HPI    CHIEF COMPLAINT: Abscess of the pannus    HISTORY OF PRESENT ILLNESS: Patient is a 34-year-old male presenting for an abscess on his pannus.  It has been there for 3 days.  Last night had fluid in it but this morning when he awoke he had noticed the fluid was gone and there was a hole in that area.  No fever or chills.  Patient states he is not diabetic.  Thought this might of started as a spider bite.     REVIEW OF SYSTEMS:  Constitutional: no fever, no chills  Eyes: no discharge  ENT: no sore throat  Cardiovascular: no chest pain, no palpitations  Respiratory: no cough, no shortness of breath  Gastrointestinal: no abdominal pain, no vomiting  Genitourinary: no hematuria  Musculoskeletal: no back pain  Skin: As above  Neurological: no headache     Otherwise a complete review of systems was obtained and other than the HPI was negative     The patient's medication list, past medical history and social history elements is as listed in today's nurse's notes are reviewed and agree. The patient's family history is reviewed and is noncontributory to the presenting problem, except as indicated as above.  History of Present Illness               Objective:     Past Medical History:    Acute infective polyneuritis (HCC)    Gullian-Milladore Syndrome    Asperger's disorder (HCC)    Bipolar affective (HCC)    Enteritis due to Rotavirus              Past Surgical History:   Procedure Laterality Date    Other surgical history  1/10/2012    left ankle foot ligament repair                Social History     Socioeconomic History    Marital status: Single   Tobacco Use    Smoking status: Never    Smokeless tobacco: Never   Substance and Sexual Activity    Alcohol use: No     Alcohol/week: 0.0 standard drinks of alcohol    Drug use: No    Other Topics Concern    Caffeine Concern Yes     Comment: 1-2 cans of pop weekly    Exercise Yes     Comment: active with volunteering     Social Drivers of Health     Food Insecurity: Not on File (2024)    Received from [facility]    Food Insecurity     Food: 0   Transportation Needs: Not on File (2022)    Received from [facility]    Transportation Needs     Transportation: 0   Housing Stability: Not on File (2022)    Received from [facility]    Housing Stability     Housin                                Physical Exam     ED Triage Vitals [25 1323]   /87   Pulse 84   Resp 16   Temp 98.6 °F (37 °C)   Temp src Oral   SpO2 97 %   O2 Device None (Room air)       Current Vitals:   Vital Signs  BP: 141/87  Pulse: 90  Resp: 16  Temp: 98.6 °F (37 °C)  Temp src: Oral    Oxygen Therapy  SpO2: 97 %  O2 Device: None (Room air)        Physical Exam  Vital signs and nursing notes reviewed  General Appearance: No acute distress  Neurological:  A&Ox3,  Gait normal.  Psychiatric: calm and cooperative  Respiratory: CTAB  Cardiovascular: RRR, S1/S2, no m/c/r  Musculoskeletal: Extremities are symmetrical, full range of motion  Skin:  warm and dry, no rashes.   Pannus: on the underside of the pannus on the left side there is an area of induration that's approximately 5 cm by 5 cm, with a central spontaneous drainage site, which is currently dry.  Some fainter erythema extends beyond the induration.  No fluctuance noted.       Physical Exam                ED Course     Labs Reviewed   POCT GLUCOSE - Abnormal; Notable for the following components:       Result Value    POC Glucose 135 (*)     All other components within normal limits     Differential diagnosis is abscess with cellulitis, panniculitis,     Results                                 MDM      This is a well-appearing 34-year-old male presenting for an abdominal wall abscess that developed a few days ago.  Abscess spontaneously ruptured in the overnight hours  but patient does have some surrounding erythema and induration consistent with cellulitis.  Will treat with Bactrim DS twice daily for the next week.  Needs a wound check with his PCP in the next 48 to 72 hours.  Discussed strict return precautions.  If there are new, changing or worsening symptoms return for reevaluation.  Patient voiced understanding to the treatment plan.  All questions answered.  This patient was seen and examined with Dr. Workman, who agrees with the disposition and plan.        Medical Decision Making  Amount and/or Complexity of Data Reviewed  Labs: ordered. Decision-making details documented in ED Course.    Risk  Prescription drug management.        Disposition and Plan     Clinical Impression:  1. Cellulitis of abdominal wall         Disposition:  Discharge  4/28/2025  3:57 pm    Follow-up:  Shanique Keller MD  [address] [place] [id number] [phone]    Follow up in 3 day(s)  For wound re-check          Medications Prescribed:  Current Discharge Medication List        START taking these medications    Details   sulfamethoxazole-trimethoprim -160 MG Oral Tab per tablet Take 1 tablet by mouth 2 (two) times daily for 7 days.  Qty: 14 tablet, Refills: 0             Supplementary Documentation:

## (undated) NOTE — ED AVS SNAPSHOT
Marmartha Phillip   MRN: [id number]    Department:  [facility] Emergency Department in [place]   Date of Visit:  3/30/2018           Disclosure     Insurance plans vary and the physician(s) referred by the ER may not be covered by your plan.  Please cont tell this physician (or your personal doctor if your instructions are to return to your personal doctor) about any new or lasting problems. The primary care or specialist physician will see patients referred from the [facility] Emergency Department.  Kapil Leonard

## (undated) NOTE — ED AVS SNAPSHOT
Avila Aguila   MRN: [id number]    Department:  [address] [facility] Department in [place]   Date of Visit:  4/14/2018           Disclosure     Insurance plans vary and the physician(s) referred by the ER may not be covered by your plan.  Please cont tell this physician (or your personal doctor if your instructions are to return to your personal doctor) about any new or lasting problems. The primary care or specialist physician will see patients referred from the [facility] Emergency Department.  Han Condon

## (undated) NOTE — ED AVS SNAPSHOT
Margaretkoko Gonzales   MRN: [id number]    Department:  THE [facility] Emergency Department in [place]   Date of Visit:  10/13/2018           Disclosure     Insurance plans vary and the physician(s) referred by the ER may not be covered by your plan.  Please con tell this physician (or your personal doctor if your instructions are to return to your personal doctor) about any new or lasting problems. The primary care or specialist physician will see patients referred from the [facility] Emergency Department.  Wing Brizuela

## (undated) NOTE — ED AVS SNAPSHOT
Tyrell Candelaria   MRN: [id number]    Department:  [facility] Emergency Department in [place]   Date of Visit:  10/25/2018           Disclosure     Insurance plans vary and the physician(s) referred by the ER may not be covered by your plan.  Please con tell this physician (or your personal doctor if your instructions are to return to your personal doctor) about any new or lasting problems. The primary care or specialist physician will see patients referred from the [facility] Emergency Department.  London Young

## (undated) NOTE — MR AVS SNAPSHOT
[facility]  [phone]               Thank you for choosing us for your health care visit with Manan Hicks MD.  We are glad to serve you and happy to provide you with this pitt This list is accurate as of: 3/15/17  3:00 PM.  Always use your most recent med list.                folic acid 858 MCG Tabs   Take 800 mcg by mouth daily.    Commonly known as:  FOLVITE           Paliperidone ER 9 MG Tb24   Take 9 mg by mouth every morning

## (undated) NOTE — ED AVS SNAPSHOT
Kenan Dago   MRN: [id number]    Department:  [facility] in [place]   Date of Visit:  3/8/2019           Disclosure     Insurance plans vary and the physician(s) referred by the ER may not be covered by your plan.  Please conta tell this physician (or your personal doctor if your instructions are to return to your personal doctor) about any new or lasting problems. The primary care or specialist physician will see patients referred from the [facility] Emergency Department.  Rory Belle

## (undated) NOTE — LETTER
Date & Time: 5/21/2019, 8:03 AM  Patient: Pauolia  Encounter Provider(s):    Leander Yanez MD       To Whom It May Concern:    Ernesto Morales was seen and treated in our department on 5/21/2019. He should not return to work until 5/23/19.

## (undated) NOTE — LETTER
Date & Time: 4/14/2018, 1:48 PM  Patient: Mary  Encounter Provider(s):    Viri Pinzon MD       To Whom It May Concern:    Patricio Mars was seen and treated in our department on 4/14/2018.  He should be excused from work for the next 2-3

## (undated) NOTE — ED AVS SNAPSHOT
Kacie Dai   MRN: [id number]    Department:  [address] [facility] Emergency Department in [place]   Date of Visit:  6/2/2018           Disclosure     Insurance plans vary and the physician(s) referred by the ER may not be covered by your plan.  Please conta tell this physician (or your personal doctor if your instructions are to return to your personal doctor) about any new or lasting problems. The primary care or specialist physician will see patients referred from the [facility] Emergency Department.  Kristina Tuttle

## (undated) NOTE — LETTER
Date: 12/30/2017    Patient Name: Salud Emmanuel          To Whom it may concern: The above patient was seen at the [facility] for treatment of a medical condition.     This patient should be excused from attending work/school from 12/30

## (undated) NOTE — ED AVS SNAPSHOT
Alvarez Thompson   MRN: [id number]    Department:  THE [facility] Emergency Department in [place]   Date of Visit:  5/21/2019           Disclosure     Insurance plans vary and the physician(s) referred by the ER may not be covered by your plan.  Please cont tell this physician (or your personal doctor if your instructions are to return to your personal doctor) about any new or lasting problems. The primary care or specialist physician will see patients referred from the [facility] Emergency Department.  Jeanine Ulrich

## (undated) NOTE — ED AVS SNAPSHOT
Michael Wren   MRN: [id number]    Department:  [facility] Emergency Department in [place]   Date of Visit:  6/25/2018           Disclosure     Insurance plans vary and the physician(s) referred by the ER may not be covered by your plan.  Please cont tell this physician (or your personal doctor if your instructions are to return to your personal doctor) about any new or lasting problems. The primary care or specialist physician will see patients referred from the [facility] Emergency Department.  Arthor Bernheim

## (undated) NOTE — LETTER
Date & Time: 6/25/2018, 1:21 PM  Patient: Zach Neil  Encounter Provider(s):    On File, E ANA Attending  Leo Chappell MD       To Whom It May Concern:    Shilpa Feng was seen and treated in our department on 6/25/2018.  He can return to [employer]

## (undated) NOTE — LETTER
Date & Time: 11/26/2018, 5:23 PM  Patient: Deborah Amezcua  Encounter Provider(s):    Soco Stack       To Whom It May Concern:    Amber Padilla was seen and treated in our department on 11/26/2018.  He should not return to work until 11/30/

## (undated) NOTE — ED AVS SNAPSHOT
Trey Mcelroy   MRN: [id number]    Department:  [facility] Emergency Department in [place]   Date of Visit:  10/3/2019           Disclosure     Insurance plans vary and the physician(s) referred by the ER may not be covered by your plan.  Please cont tell this physician (or your personal doctor if your instructions are to return to your personal doctor) about any new or lasting problems. The primary care or specialist physician will see patients referred from the [facility] Emergency Department.  Jean Pham

## (undated) NOTE — ED AVS SNAPSHOT
Idalia Yan   MRN: [id number]    Department:  [facility] Emergency Department in [place]   Date of Visit:  2/13/2018           Disclosure     Insurance plans vary and the physician(s) referred by the ER may not be covered by your plan.  Please cont tell this physician (or your personal doctor if your instructions are to return to your personal doctor) about any new or lasting problems. The primary care or specialist physician will see patients referred from the [facility] Emergency Department.  Shelton Lombard

## (undated) NOTE — LETTER
Date & Time: 10/25/2018, 10:25 AM  Patient: Trey Mcelroy  Encounter Provider(s):    Tiara Hernandez MD       To Whom It May Concern:    Daniel Adam was seen and treated in our department on 10/25/2018.  He should not return to work until [employer]

## (undated) NOTE — ED AVS SNAPSHOT
Robb Blake   MRN: [id number]    Department:  [facility] Emergency Department in [place]   Date of Visit:  10/28/2017           Disclosure     Insurance plans vary and the physician(s) referred by the ER may not be covered by your plan.  Please con If you have been prescribed any medication(s), please fill your prescription right away and begin taking the medication(s) as directed    If the emergency physician has read X-rays, these will be re-interpreted by a radiologist.  If there is a significant

## (undated) NOTE — ED AVS SNAPSHOT
Aida Mejia   MRN: [id number]    Department:  [facility] Emergency Department in [place]   Date of Visit:  10/12/2018           Disclosure     Insurance plans vary and the physician(s) referred by the ER may not be covered by your plan.  Please con tell this physician (or your personal doctor if your instructions are to return to your personal doctor) about any new or lasting problems. The primary care or specialist physician will see patients referred from the [facility] Emergency Department.  Toño Stone

## (undated) NOTE — ED AVS SNAPSHOT
Lucy López   MRN: [id number]    Department:  THE [facility] Emergency Department in [place]   Date of Visit:  6/10/2019           Disclosure     Insurance plans vary and the physician(s) referred by the ER may not be covered by your plan.  Please cont tell this physician (or your personal doctor if your instructions are to return to your personal doctor) about any new or lasting problems. The primary care or specialist physician will see patients referred from the [facility] Emergency Department.  Matt Rashid

## (undated) NOTE — LETTER
Date & Time: 3/8/2019, 9:32 AM  Patient: Mary  Encounter Provider(s):    Ramesh Marquez MD       To Whom It May Concern:    Amber Padilla was seen and treated in our department on 3/8/2019. He should not return to work until 3/11.     If

## (undated) NOTE — ED AVS SNAPSHOT
Liliana Tristan   MRN: [id number]    Department:  [address] [facility] Emergency Department in [place]   Date of Visit:  11/26/2018           Disclosure     Insurance plans vary and the physician(s) referred by the ER may not be covered by your plan.  Please con tell this physician (or your personal doctor if your instructions are to return to your personal doctor) about any new or lasting problems. The primary care or specialist physician will see patients referred from the [facility] Emergency Department.  Kapil Leonard

## (undated) NOTE — LETTER
Date & Time: 6/2/2018, 9:18 AM  Patient: Mary  Encounter Provider(s):    Leticia Rodarte DO       To Whom It May Concern:    Chente Ken was seen and treated in our department on 6/2/2018. He should not return to work until 6/4/18.     If yo

## (undated) NOTE — LETTER
February 13, 2018    Patient: Margaret Gonzales   Date of Visit: 2/13/2018       To Whom It May Concern:    Orville Aguayo was seen and treated in our emergency department on 2/13/2018. He should not return to work until 2/16/2018.     If you have any